# Patient Record
Sex: MALE | NOT HISPANIC OR LATINO | URBAN - METROPOLITAN AREA
[De-identification: names, ages, dates, MRNs, and addresses within clinical notes are randomized per-mention and may not be internally consistent; named-entity substitution may affect disease eponyms.]

---

## 2017-06-02 ENCOUNTER — EMERGENCY (EMERGENCY)
Facility: HOSPITAL | Age: 77
LOS: 0 days | Discharge: HOME | End: 2017-06-02
Attending: EMERGENCY MEDICINE | Admitting: EMERGENCY MEDICINE
Payer: SELF-PAY

## 2017-06-02 VITALS
HEART RATE: 84 BPM | RESPIRATION RATE: 17 BRPM | DIASTOLIC BLOOD PRESSURE: 80 MMHG | OXYGEN SATURATION: 97 % | TEMPERATURE: 98 F | SYSTOLIC BLOOD PRESSURE: 130 MMHG

## 2017-06-02 VITALS
OXYGEN SATURATION: 96 % | SYSTOLIC BLOOD PRESSURE: 133 MMHG | HEART RATE: 88 BPM | RESPIRATION RATE: 18 BRPM | TEMPERATURE: 98 F | HEIGHT: 66 IN | DIASTOLIC BLOOD PRESSURE: 74 MMHG | WEIGHT: 154.32 LBS

## 2017-06-02 DIAGNOSIS — Z87.11 PERSONAL HISTORY OF PEPTIC ULCER DISEASE: ICD-10-CM

## 2017-06-02 DIAGNOSIS — R94.31 ABNORMAL ELECTROCARDIOGRAM [ECG] [EKG]: ICD-10-CM

## 2017-06-02 DIAGNOSIS — R29.810 FACIAL WEAKNESS: ICD-10-CM

## 2017-06-02 DIAGNOSIS — Z79.82 LONG TERM (CURRENT) USE OF ASPIRIN: ICD-10-CM

## 2017-06-02 DIAGNOSIS — I15.9 SECONDARY HYPERTENSION, UNSPECIFIED: ICD-10-CM

## 2017-06-02 DIAGNOSIS — I63.9 CEREBRAL INFARCTION, UNSPECIFIED: ICD-10-CM

## 2017-06-02 DIAGNOSIS — I48.91 UNSPECIFIED ATRIAL FIBRILLATION: ICD-10-CM

## 2017-06-02 LAB
ALBUMIN SERPL ELPH-MCNC: 2.7 G/DL — LOW (ref 3.3–5)
ALP SERPL-CCNC: 61 U/L — SIGNIFICANT CHANGE UP (ref 40–120)
ALT FLD-CCNC: 27 U/L — SIGNIFICANT CHANGE UP (ref 12–78)
ANION GAP SERPL CALC-SCNC: 9 MMOL/L — SIGNIFICANT CHANGE UP (ref 5–17)
APTT BLD: 25.1 SEC — LOW (ref 27.5–37.4)
AST SERPL-CCNC: 22 U/L — SIGNIFICANT CHANGE UP (ref 15–37)
BASOPHILS # BLD AUTO: 0 K/UL — SIGNIFICANT CHANGE UP (ref 0–0.2)
BASOPHILS NFR BLD AUTO: 0.4 % — SIGNIFICANT CHANGE UP (ref 0–2)
BILIRUB SERPL-MCNC: 0.5 MG/DL — SIGNIFICANT CHANGE UP (ref 0.2–1.2)
BUN SERPL-MCNC: 17 MG/DL — SIGNIFICANT CHANGE UP (ref 7–23)
CALCIUM SERPL-MCNC: 9.3 MG/DL — SIGNIFICANT CHANGE UP (ref 8.5–10.1)
CHLORIDE SERPL-SCNC: 91 MMOL/L — LOW (ref 96–108)
CO2 SERPL-SCNC: 34 MMOL/L — HIGH (ref 22–31)
CREAT SERPL-MCNC: 1.08 MG/DL — SIGNIFICANT CHANGE UP (ref 0.5–1.3)
EOSINOPHIL # BLD AUTO: 0.3 K/UL — SIGNIFICANT CHANGE UP (ref 0–0.5)
EOSINOPHIL NFR BLD AUTO: 4.4 % — SIGNIFICANT CHANGE UP (ref 0–6)
GLUCOSE SERPL-MCNC: 124 MG/DL — HIGH (ref 70–99)
HCT VFR BLD CALC: 41.1 % — SIGNIFICANT CHANGE UP (ref 39–50)
HGB BLD-MCNC: 14.6 G/DL — SIGNIFICANT CHANGE UP (ref 13–17)
INR BLD: 1.16 RATIO — SIGNIFICANT CHANGE UP (ref 0.88–1.16)
LYMPHOCYTES # BLD AUTO: 1.2 K/UL — SIGNIFICANT CHANGE UP (ref 1–3.3)
LYMPHOCYTES # BLD AUTO: 15.8 % — SIGNIFICANT CHANGE UP (ref 13–44)
MCHC RBC-ENTMCNC: 32.2 PG — SIGNIFICANT CHANGE UP (ref 27–34)
MCHC RBC-ENTMCNC: 35.5 GM/DL — SIGNIFICANT CHANGE UP (ref 32–36)
MCV RBC AUTO: 90.7 FL — SIGNIFICANT CHANGE UP (ref 80–100)
MONOCYTES # BLD AUTO: 0.5 K/UL — SIGNIFICANT CHANGE UP (ref 0–0.9)
MONOCYTES NFR BLD AUTO: 6.8 % — SIGNIFICANT CHANGE UP (ref 2–14)
NEUTROPHILS # BLD AUTO: 5.3 K/UL — SIGNIFICANT CHANGE UP (ref 1.8–7.4)
NEUTROPHILS NFR BLD AUTO: 72.6 % — SIGNIFICANT CHANGE UP (ref 43–77)
PLATELET # BLD AUTO: 362 K/UL — SIGNIFICANT CHANGE UP (ref 150–400)
POTASSIUM SERPL-MCNC: 3.4 MMOL/L — LOW (ref 3.5–5.3)
POTASSIUM SERPL-SCNC: 3.4 MMOL/L — LOW (ref 3.5–5.3)
PROT SERPL-MCNC: 7.7 GM/DL — SIGNIFICANT CHANGE UP (ref 6–8.3)
PROTHROM AB SERPL-ACNC: 12.6 SEC — SIGNIFICANT CHANGE UP (ref 9.8–12.7)
RBC # BLD: 4.53 M/UL — SIGNIFICANT CHANGE UP (ref 4.2–5.8)
RBC # FLD: 10.8 % — SIGNIFICANT CHANGE UP (ref 10.3–14.5)
SODIUM SERPL-SCNC: 134 MMOL/L — LOW (ref 135–145)
TROPONIN I SERPL-MCNC: 0.36 NG/ML — HIGH (ref 0.01–0.04)
WBC # BLD: 7.3 K/UL — SIGNIFICANT CHANGE UP (ref 3.8–10.5)
WBC # FLD AUTO: 7.3 K/UL — SIGNIFICANT CHANGE UP (ref 3.8–10.5)

## 2017-06-02 PROCEDURE — 70450 CT HEAD/BRAIN W/O DYE: CPT | Mod: 26

## 2017-06-02 PROCEDURE — 93010 ELECTROCARDIOGRAM REPORT: CPT

## 2017-06-02 PROCEDURE — 99285 EMERGENCY DEPT VISIT HI MDM: CPT | Mod: 25

## 2017-06-02 PROCEDURE — 99053 MED SERV 10PM-8AM 24 HR FAC: CPT

## 2017-06-02 PROCEDURE — 70551 MRI BRAIN STEM W/O DYE: CPT | Mod: 26

## 2017-06-02 RX ORDER — RIVAROXABAN 15 MG-20MG
20 KIT ORAL DAILY
Qty: 0 | Refills: 0 | Status: DISCONTINUED | OUTPATIENT
Start: 2017-06-02 | End: 2017-06-02

## 2017-06-02 RX ORDER — PROPAFENONE HCL 150 MG
0 TABLET ORAL
Qty: 0 | Refills: 0 | COMMUNITY

## 2017-06-02 RX ORDER — ASPIRIN/CALCIUM CARB/MAGNESIUM 324 MG
1 TABLET ORAL
Qty: 0 | Refills: 0 | COMMUNITY

## 2017-06-02 NOTE — ED ADULT NURSE NOTE - OBJECTIVE STATEMENT
Pt's daughter reports pt unable to use remote yesterday afternoon, no other change.  L arm drift, L facial droop noted this morning, now resolving.  Recent viral illness, fever, now resolved.

## 2017-06-02 NOTE — ED PROVIDER NOTE - PROGRESS NOTE DETAILS
Attending mulugeta Pritchard/w Dr. Parikh for neurology consult.  Suggest MRI/MRA brain Attending mulugeta Pritchard/jonathan daughter about results Attending Pritchard, d/w daughter (Dr. Quintero) about results.  Declined MRI at this time, feels pt will not tolerate exam. Pt prob tia/stroke.  Given afib hx would like to start xeralta and request d/c home.  D/w daughter about elevated trop, offered hospital admission for further obs and work up.  Declined at this time. Attending Francheska d/w with daughter about results.  Again offered social work consult/hospital admission.  Feels that has enough help to take care of pt at home. Attending Francheska, daughter is now agreeable to MRI. Attending Francheska, d/w with pt and daughter results of MRI.  Will start xeralto at home. Suggest to follow up with neurology. Attending Francheska, d/w with pt and daughter results of MRI.  Decline admission to hospital.  Will start xeralto at home. Suggest to follow up with neurology. Attending Francheska, d/w daughter about results.  Declined MRI at this time, feels pt will not tolerate exam. Pt prob tia/stroke.  Given afib hx would like to start xeralta and request d/c home.  D/w daughter about elevated trop, offered hospital admission for further obs and work up.  Declined at this time.

## 2017-06-02 NOTE — ED PROVIDER NOTE - OBJECTIVE STATEMENT
77 yo pt Citizen of [place] speaking and currently visiting [place].  Pt here with Daughter (Dr. Quintero).  Pt presents for change in speech and facial droop.  Per daughter pt last seen normal last night.  This morning, woke up with symptoms (last seen normal last night).  Daughter also noticed pronator drift left arm. Pt had viral illness over the last few days.  No n/v/d, no fall, no trauma.  Pt speech has improved.  Left lower facial droop now almost gone. 77 yo pt [place] speaking and currently visiting [place].  Pt here with Daughter (Dr. Quintero).  Pt presents for change in speech and facial droop.  Per daughter pt last seen normal last night.  This morning, woke up with symptoms (last seen normal last night).  Daughter also noticed pronator drift left arm. Pt had viral illness over the last few days.  Yesterday pt with difficulty with using remote, but per daughter pt was neurologically intact. No n/v/d, no fall, no trauma.  Pt speech has improved.  Left lower facial droop now almost gone. 77 yo pt [place] speaking and currently visiting [place]. .  Pt presents for change in speech and facial droop.  Per daughter pt last seen normal last night.  This morning, woke up with symptoms (last seen normal last night).  Daughter also noticed pronator drift left arm. Pt had viral illness over the last few days.  Yesterday pt with difficulty with using remote, but per daughter pt was neurologically intact. No n/v/d, no fall, no trauma.  Pt speech has improved.  Left lower facial droop now almost gone.

## 2017-06-02 NOTE — ED PROVIDER NOTE - MEDICAL DECISION MAKING DETAILS
75 yo pt with stroke like symptoms.  Pt last seen normal last night and now with rapid improve symptoms.  Pt not tpa candidate.  Check labs, ct scan

## 2017-06-02 NOTE — ED ADULT NURSE NOTE - CHIEF COMPLAINT QUOTE
Pt's daughter reports pt unable to use remote yesterday afternoon, no other change.  L arm drift, L facial droop noted this morning.  Recent viral illness, fever, now resolved.

## 2017-06-02 NOTE — ED ADULT NURSE REASSESSMENT NOTE - NS ED NURSE REASSESS COMMENT FT1
MD Russo at bedside doing a speech and swallow. Call bell within reach. Offers no complaints at this time. Side rails up bilaterally for safety. Rounding complete.

## 2019-07-28 NOTE — ED ADULT TRIAGE NOTE - STATUS:
[facility] ()  Gastroenterology; Internal Medicine  [address] [place] [address]  Phone: [phone]  Fax: [phone]  Follow Up Time: 1 week    Toby Quintana)  Critical Care Medicine; Internal Medicine; Pulmonary Disease  [address] [place] [address]  Phone: [phone]  Fax: [phone]  Follow Up Time: 1-3 days

## 2021-09-16 NOTE — ED ADULT NURSE NOTE - PRO INTERPRETER NEED 2
[place]
[FreeTextEntry1] : 53-year-old gentleman presents for an acute visit with 5 days of progressive eruption disfigurement to the face\par Erysipelas–it started as a pinpoint follicular eruption to the inner aspect of his nose.  He grew involving the area around this face became swollen disfigured and erythematous.  Afebrile.  Seen today diagnosed and started on Augmentin 875/125 twice daily with meals for 10 days.\par He is advised to recall if symptoms do not begin to dissipate\par Adjustment disorder with anxiety–dizziness divorce with his partner leading to stress and strain both on himself in the business.  Is effectively meditates.\par Hypertension–blood pressure 110/80/continue with labetalol

## 2023-08-29 NOTE — ED ADULT TRIAGE NOTE - HEIGHT IN CM
Quality 130: Documentation Of Current Medications In The Medical Record: Current Medications Documented Quality 111:Pneumonia Vaccination Status For Older Adults: Patient received any pneumococcal conjugate or polysaccharide vaccine on or after their 60th birthday and before the end of the measurement period Detail Level: Detailed Quality 110: Preventive Care And Screening: Influenza Immunization: Influenza immunization was not ordered or administered, reason not given 172.72 Quality 47: Advance Care Plan: Advance Care Planning discussed and documented; advance care plan or surrogate decision maker documented in the medical record. Quality 226: Preventive Care And Screening: Tobacco Use: Screening And Cessation Intervention: Patient screened for tobacco use and is an ex/non-smoker Quality 431: Preventive Care And Screening: Unhealthy Alcohol Use - Screening: Patient not identified as an unhealthy alcohol user when screened for unhealthy alcohol use using a systematic screening method